# Patient Record
Sex: MALE | Employment: FULL TIME | URBAN - METROPOLITAN AREA
[De-identification: names, ages, dates, MRNs, and addresses within clinical notes are randomized per-mention and may not be internally consistent; named-entity substitution may affect disease eponyms.]

---

## 2019-01-15 ENCOUNTER — OFFICE VISIT (OUTPATIENT)
Dept: FAMILY MEDICINE | Facility: CLINIC | Age: 49
End: 2019-01-15
Payer: COMMERCIAL

## 2019-01-15 VITALS
OXYGEN SATURATION: 98 % | HEIGHT: 75 IN | HEART RATE: 90 BPM | BODY MASS INDEX: 34.69 KG/M2 | DIASTOLIC BLOOD PRESSURE: 100 MMHG | TEMPERATURE: 97.4 F | WEIGHT: 279 LBS | SYSTOLIC BLOOD PRESSURE: 160 MMHG

## 2019-01-15 DIAGNOSIS — Z13.1 SCREENING FOR DIABETES MELLITUS: ICD-10-CM

## 2019-01-15 DIAGNOSIS — Z13.220 LIPID SCREENING: ICD-10-CM

## 2019-01-15 DIAGNOSIS — I10 ESSENTIAL HYPERTENSION: Primary | ICD-10-CM

## 2019-01-15 PROCEDURE — 99204 OFFICE O/P NEW MOD 45 MIN: CPT | Performed by: FAMILY MEDICINE

## 2019-01-15 RX ORDER — HYDROCHLOROTHIAZIDE 25 MG/1
25 TABLET ORAL DAILY
Qty: 90 TABLET | Refills: 3 | Status: SHIPPED | OUTPATIENT
Start: 2019-01-15 | End: 2020-01-13

## 2019-01-15 SDOH — HEALTH STABILITY: MENTAL HEALTH: HOW OFTEN DO YOU HAVE A DRINK CONTAINING ALCOHOL?: 2-4 TIMES A MONTH

## 2019-01-15 ASSESSMENT — ENCOUNTER SYMPTOMS
COLOR CHANGE: 0
SINUS PAIN: 0
WHEEZING: 0
SLEEP DISTURBANCE: 0
SPEECH DIFFICULTY: 0
NUMBNESS: 0
NAUSEA: 0
ACTIVITY CHANGE: 0
DIFFICULTY URINATING: 0
BLOOD IN STOOL: 0
SEIZURES: 0
ABDOMINAL DISTENTION: 0
FATIGUE: 0
NERVOUS/ANXIOUS: 0
ABDOMINAL PAIN: 0
FEVER: 0
BACK PAIN: 0
HEMATURIA: 0
PALPITATIONS: 0
DYSURIA: 0
SHORTNESS OF BREATH: 0
FACIAL SWELLING: 0
WEAKNESS: 0
JOINT SWELLING: 0
DIZZINESS: 0
APPETITE CHANGE: 0
COUGH: 0
EYE DISCHARGE: 0
SORE THROAT: 0
AGITATION: 0
CHILLS: 0
EYE PAIN: 0
VOMITING: 0
WOUND: 0
EYE ITCHING: 0
HEADACHES: 0

## 2019-01-15 ASSESSMENT — MIFFLIN-ST. JEOR: SCORE: 2220.13

## 2019-01-15 NOTE — PROGRESS NOTES
SUBJECTIVE:   Alin Yang is a 48 year old male who presents to clinic today for the following health issues:      *Establish care  *Elevated blood pressure at recent clinic visit    Past Medical History:   Diagnosis Date     Alopecia        Past Surgical History:   Procedure Laterality Date     FINGER SURGERY Left     4th phalanx       Family History   Problem Relation Age of Onset     ALS Mother      Hypertension Father        Social History     Tobacco Use     Smoking status: Former Smoker     Smokeless tobacco: Former User   Substance Use Topics     Alcohol use: Yes     Frequency: 2-4 times a month     Comment: weekends (5-6 beers)      No Known Allergies    No current outpatient medications on file.     No current facility-administered medications for this visit.          Reviewed and updated as needed this visit by clinical staff  Tobacco  Allergies  Meds  Med Hx  Surg Hx  Fam Hx  Soc Hx      Reviewed and updated as needed this visit by Provider        1. Establish care    2. High blood pressure: Noticed elevated 3 years ago.  Less active.  Noticed weight gain.  Gained 30 lbs over the past 4 years.  Patient use to life weights.  Patient kayaks.  Family history.  Patient is not smoker.  Patient does not have a strict.  Patient thinks he can do better on portion size.  No headaches or vision issues.  No chest pain.     3. Health maintenance: Patient would like to be screened for diabetes and high cholesterol.       ROS:  Review of Systems   Constitutional: Negative for activity change, appetite change, chills, fatigue and fever.   HENT: Negative for congestion, ear pain, facial swelling, sinus pain and sore throat.    Eyes: Negative for pain, discharge, itching and visual disturbance.   Respiratory: Negative for cough, shortness of breath and wheezing.    Cardiovascular: Negative for chest pain, palpitations and leg swelling.   Gastrointestinal: Negative for abdominal distention, abdominal pain, blood  "in stool, nausea and vomiting.   Genitourinary: Negative for difficulty urinating, dysuria, hematuria and testicular pain.   Musculoskeletal: Negative for back pain, gait problem and joint swelling.   Skin: Negative for color change, rash and wound.   Neurological: Negative for dizziness, seizures, speech difficulty, weakness, numbness and headaches.   Psychiatric/Behavioral: Negative for agitation, behavioral problems, sleep disturbance and suicidal ideas. The patient is not nervous/anxious.        OBJECTIVE:     BP (!) 160/100 (BP Location: Left arm, Cuff Size: Adult Large)   Pulse 90   Temp 97.4  F (36.3  C) (Tympanic)   Ht 1.911 m (6' 3.25\")   Wt 126.6 kg (279 lb)   SpO2 98%   BMI 34.64 kg/m    Body mass index is 34.64 kg/m .  Physical Exam   Constitutional: He is oriented to person, place, and time. He appears well-developed and well-nourished. No distress.   HENT:   Head: Normocephalic and atraumatic.   Nose: Nose normal.   Mouth/Throat: No oropharyngeal exudate.   Eyes: Conjunctivae and EOM are normal. Pupils are equal, round, and reactive to light. Right eye exhibits no discharge. Left eye exhibits no discharge.   Neck: Normal range of motion. Neck supple. No tracheal deviation present. No thyromegaly present.   Cardiovascular: Normal rate, regular rhythm and normal heart sounds.   Pulmonary/Chest: Effort normal and breath sounds normal. No respiratory distress. He has no wheezes. He has no rales.   Abdominal: Soft. He exhibits no distension and no mass. There is no tenderness. There is no rebound and no guarding.   Overweight   Musculoskeletal: Normal range of motion. He exhibits no edema or deformity.   Lymphadenopathy:     He has no cervical adenopathy.   Neurological: He is alert and oriented to person, place, and time. Coordination normal.   Skin: Skin is warm and dry. No rash noted. No erythema.   Psychiatric: He has a normal mood and affect. His behavior is normal. Thought content normal. "     ASSESSMENT/PLAN:     1. Essential hypertension  Newly diagnosed.  Stressed the importance of diet and exercise.  DASH diet given to patient.   - hydrochlorothiazide (HYDRODIURIL) 25 MG tablet; Take 1 tablet (25 mg) by mouth daily  Dispense: 90 tablet; Refill: 3    2. Lipid screening  - Lipid Profile (Chol, Trig, HDL, LDL calc); Future    3. Screening for diabetes mellitus  - Glucose    See Patient Instructions    Wade Ely [facility]

## 2019-01-16 DIAGNOSIS — Z13.1 SCREENING FOR DIABETES MELLITUS: ICD-10-CM

## 2019-01-16 DIAGNOSIS — Z13.220 LIPID SCREENING: ICD-10-CM

## 2019-01-16 LAB
CHOLEST SERPL-MCNC: 214 MG/DL
GLUCOSE SERPL-MCNC: 92 MG/DL (ref 70–99)
HDLC SERPL-MCNC: 46 MG/DL
LDLC SERPL CALC-MCNC: 130 MG/DL
NONHDLC SERPL-MCNC: 168 MG/DL
TRIGL SERPL-MCNC: 192 MG/DL

## 2019-01-16 PROCEDURE — 36415 COLL VENOUS BLD VENIPUNCTURE: CPT | Performed by: FAMILY MEDICINE

## 2019-01-16 PROCEDURE — 80061 LIPID PANEL: CPT | Performed by: FAMILY MEDICINE

## 2019-01-16 PROCEDURE — 82947 ASSAY GLUCOSE BLOOD QUANT: CPT | Performed by: FAMILY MEDICINE

## 2019-01-17 PROBLEM — E78.5 BORDERLINE HYPERLIPIDEMIA: Status: ACTIVE | Noted: 2019-01-17

## 2019-02-11 NOTE — PROGRESS NOTES
SUBJECTIVE:   Alin Yang is a 48 year old male who presents to clinic today for the following health issues:      Hypertension Follow-up      Outpatient blood pressures are not being checked.    Low Salt Diet: not monitoring salt      Amount of exercise or physical activity: 4-5 days/week     Problems taking medications regularly: No    Medication side effects: none    Diet: regular (no restrictions)    Patient Active Problem List   Diagnosis     Alopecia     Borderline hyperlipidemia     Past Surgical History:   Procedure Laterality Date     FINGER SURGERY Left     4th phalanx       Social History     Tobacco Use     Smoking status: Former Smoker     Smokeless tobacco: Former User   Substance Use Topics     Alcohol use: Yes     Frequency: 2-4 times a month     Comment: weekends (5-6 beers)     Family History   Problem Relation Age of Onset     ALS Mother      Hypertension Father          Current Outpatient Medications   Medication Sig Dispense Refill     hydrochlorothiazide (HYDRODIURIL) 25 MG tablet Take 1 tablet (25 mg) by mouth daily 90 tablet 3     No Known Allergies    Reviewed and updated as needed this visit by clinical staff  Tobacco  Allergies  Meds  Med Hx  Surg Hx  Fam Hx  Soc Hx      Reviewed and updated as needed this visit by Provider        1. Hypertension f/u: Had blood pressure reading 140/80.  Lost 5 lbs since the last visit.  Tolerating hydrochlorothiazide.  Family history of HTN.  No chest pain or SOB.  He is overweight.     ROS:  Review of Systems   Constitutional: Negative for activity change, appetite change, fatigue and fever.   HENT: Negative.    Eyes: Negative for pain, discharge and visual disturbance.   Respiratory: Negative for cough, shortness of breath and wheezing.    Cardiovascular: Negative for chest pain, palpitations and leg swelling.   Gastrointestinal: Negative.    Skin: Negative for color change and rash.   Neurological: Negative for dizziness, seizures and  "headaches.   Psychiatric/Behavioral: Negative for agitation, confusion, decreased concentration and hallucinations. The patient is not nervous/anxious.        OBJECTIVE:     BP (!) 146/98 (BP Location: Left arm, Cuff Size: Adult Large)   Pulse 84   Temp 98.9  F (37.2  C) (Tympanic)   Ht 1.911 m (6' 3.25\")   Wt 124.3 kg (274 lb)   SpO2 97%   BMI 34.02 kg/m    Body mass index is 34.02 kg/m .  Physical Exam   Constitutional: He is oriented to person, place, and time. He appears well-developed and well-nourished. No distress.   HENT:   Head: Normocephalic and atraumatic.   Nose: Nose normal.   Eyes: Conjunctivae and EOM are normal.   Neck: Normal range of motion. No tracheal deviation present.   Cardiovascular: Normal rate, regular rhythm and normal heart sounds.   Pulmonary/Chest: Effort normal. He has no wheezes.   Musculoskeletal: Normal range of motion.   Neurological: He is alert and oriented to person, place, and time.   Skin: No rash noted. No erythema.   Psychiatric: He has a normal mood and affect. His behavior is normal.     ASSESSMENT/PLAN:     1. Benign essential hypertension  Not well controlled.  Continue with hydrochlorothiazide.  Stressed the importance of diet and exercise.  Decrease salt intake.   - lisinopril (PRINIVIL/ZESTRIL) 10 MG tablet; Take 1 tablet (10 mg) by mouth daily  Dispense: 90 tablet; Refill: 3    See Patient Instructions    Wade Ely,   [facility]    "

## 2019-02-12 ENCOUNTER — OFFICE VISIT (OUTPATIENT)
Dept: FAMILY MEDICINE | Facility: CLINIC | Age: 49
End: 2019-02-12
Payer: COMMERCIAL

## 2019-02-12 VITALS
BODY MASS INDEX: 34.07 KG/M2 | HEART RATE: 84 BPM | TEMPERATURE: 98.9 F | WEIGHT: 274 LBS | HEIGHT: 75 IN | DIASTOLIC BLOOD PRESSURE: 98 MMHG | OXYGEN SATURATION: 97 % | SYSTOLIC BLOOD PRESSURE: 146 MMHG

## 2019-02-12 DIAGNOSIS — I10 BENIGN ESSENTIAL HYPERTENSION: Primary | ICD-10-CM

## 2019-02-12 PROCEDURE — 99213 OFFICE O/P EST LOW 20 MIN: CPT | Performed by: FAMILY MEDICINE

## 2019-02-12 RX ORDER — LISINOPRIL 10 MG/1
10 TABLET ORAL DAILY
Qty: 90 TABLET | Refills: 3 | Status: SHIPPED | OUTPATIENT
Start: 2019-02-12 | End: 2020-02-07

## 2019-02-12 ASSESSMENT — ENCOUNTER SYMPTOMS
COLOR CHANGE: 0
HEADACHES: 0
APPETITE CHANGE: 0
GASTROINTESTINAL NEGATIVE: 1
WHEEZING: 0
HALLUCINATIONS: 0
AGITATION: 0
COUGH: 0
SEIZURES: 0
SHORTNESS OF BREATH: 0
NERVOUS/ANXIOUS: 0
DIZZINESS: 0
CONFUSION: 0
DECREASED CONCENTRATION: 0
EYE PAIN: 0
FATIGUE: 0
FEVER: 0
PALPITATIONS: 0
EYE DISCHARGE: 0
ACTIVITY CHANGE: 0

## 2019-02-12 ASSESSMENT — MIFFLIN-ST. JEOR: SCORE: 2197.45

## 2019-02-12 NOTE — PATIENT INSTRUCTIONS
Maureen Yang,    Thank you for allowing [facility] to manage your care.    I sent your prescriptions to your pharmacy.    For your high blood pressure, I am adding lisinopril 10 mg daily.     Encourage 2 hours per week of cardiovascular activities (cycling, swimming, jogging, elliptical, or treadmill)    Please decrease your salt intake.     If you have any questions or concerns, please feel free to call us at [phone].    Sincerely,    Dr. Ely      Patient Education     Discharge Instructions for High Blood Pressure (Hypertension)  You have been diagnosed with high blood pressure (also called hypertension). This means the force of blood against your artery walls is too strong. It also means your heart is working hard to move blood. High blood pressure usually has no symptoms, but over time, it can cause serious health problems. High blood pressure raises your risk for heart attack, stroke, heart failure, kidney disease, and blindness. With help from your doctor, you can manage your blood pressure and protect your health.  Blood pressure measurements are given as 2 numbers. Systolic blood pressure is the upper number. This is the pressure when the heart contracts. Diastolic blood pressure is the lower number. This is the pressure when the heart relaxes between beats.  Blood pressure is categorized as normal, elevated, or stage 1 or stage 2 high blood pressure:    Normal blood pressure is systolic of less than 120 and diastolic of less than 80 (120/80)    Elevated blood pressure is systolic of 120 to 129 and diastolic less than 80    Stage 1 high blood pressure is systolic is 130 to 139 or diastolic between 80 to 89    Stage 2 high blood pressure is when systolic is 140 or higher or the diastolic is 90 or higher  Taking medicine    Learn to take your own blood pressure. Keep a record of your results. Ask your doctor which readings mean that you need medical attention.    Take your blood pressure  "medicine exactly as directed. Don t skip doses. Missing doses can cause your blood pressure to get out of control.    If you do miss a dose (or doses) check with your healthcare provider about what to do.    Don't take medicines that contain heart stimulants, including over-the-counter medicines. Check for warnings about high blood pressure on the label. Ask the pharmacist before purchasing something you haven't used before    Check with your doctor or pharmacist before taking a decongestant. Some decongestants can worsen high blood pressure.  Lifestyle changes    Maintain a healthy weight. Get help to lose any extra pounds.    Cut back on salt.  ? Limit canned, dried, packaged, and fast foods.  ? Don t add salt to your food at the table.  ? Season foods with herbs instead of salt when you cook.  ? Request no added salt when you go to a restaurant.  ? The American Heart Association (AHA) says the \"ideal\" amount of sodium is no more than 1,500 mg a day.  But because Americans eat so much salt, you can make a positive change by cutting back to even 2,400 mg of sodium a day.     Follow the DASH (Dietary Approaches to Stop Hypertension) eating plan. This plan recommends vegetables, fruits, whole gains, and other heart healthy foods.    Begin an exercise program. Ask your healthcare provider how to get started. The AHA recommends aerobic exercise 3 to 4 times a week for an average of 40 minutes at a time, with your provider's approval. Simple activities like walking or gardening can help.    Break the smoking habit. Enroll in a stop-smoking program to improve your chances of success. Ask your healthcare provider about programs and medicines to help you stop smoking.    Limit drinks that contain caffeine such as coffee, black or green tea, and cola to 2 per day.    Never take stimulants such as amphetamines or cocaine. These drugs can be deadly for someone with high blood pressure.    Control your stress. Learn ways to " manage stress.    Limit alcohol to no more than 1 drink a day for women and 2 drinks a day for men.  Follow-up care  Make a follow-up appointment as directed.  When to call your healthcare provider  Call your healthcare provider right away if you have any of the following:    Chest pain or shortness of breath (call 911)    Moderate to severe headache    Weakness in the muscles of your face, arms, or legs    Trouble speaking    Extreme drowsiness    Confusion    Fainting or dizziness    Pulsating or rushing sound in your ears    Unexplained nosebleed    Weakness, tingling, or numbness of your face, arms, or legs    Change in vision    Blood pressure measured at home that is greater than 180/110   Date Last Reviewed: 4/27/2016 2000-2018 The "[employer]. [address] [place] [address], [place] [address]. All rights reserved. This information is not intended as a substitute for professional medical care. Always follow your healthcare professional's instructions.

## 2019-03-19 ENCOUNTER — OFFICE VISIT (OUTPATIENT)
Dept: FAMILY MEDICINE | Facility: CLINIC | Age: 49
End: 2019-03-19
Payer: COMMERCIAL

## 2019-03-19 VITALS
DIASTOLIC BLOOD PRESSURE: 86 MMHG | SYSTOLIC BLOOD PRESSURE: 132 MMHG | HEIGHT: 76 IN | OXYGEN SATURATION: 98 % | WEIGHT: 270.6 LBS | BODY MASS INDEX: 32.95 KG/M2 | TEMPERATURE: 97.8 F | HEART RATE: 80 BPM

## 2019-03-19 DIAGNOSIS — I10 ESSENTIAL HYPERTENSION: Primary | ICD-10-CM

## 2019-03-19 PROCEDURE — 99213 OFFICE O/P EST LOW 20 MIN: CPT | Performed by: FAMILY MEDICINE

## 2019-03-19 ASSESSMENT — ENCOUNTER SYMPTOMS
CONFUSION: 0
AGITATION: 0
SEIZURES: 0
EYE PAIN: 0
ENDOCRINE NEGATIVE: 1
HEADACHES: 0
NERVOUS/ANXIOUS: 0
FEVER: 0
GASTROINTESTINAL NEGATIVE: 1
APPETITE CHANGE: 0
COUGH: 0
DECREASED CONCENTRATION: 0
FATIGUE: 0
DIZZINESS: 0
ACTIVITY CHANGE: 0
HALLUCINATIONS: 0
PALPITATIONS: 0
COLOR CHANGE: 0
WHEEZING: 0
ALLERGIC/IMMUNOLOGIC NEGATIVE: 1
HEMATOLOGIC/LYMPHATIC NEGATIVE: 1
SHORTNESS OF BREATH: 0
EYE DISCHARGE: 0

## 2019-03-19 ASSESSMENT — MIFFLIN-ST. JEOR: SCORE: 2189.96

## 2019-03-19 NOTE — PATIENT INSTRUCTIONS
Maureen Yang,    Thank you for allowing [facility] to manage your care.    Continue with your current medications.     Encourage 2 hours per week of cardiovascular activities (cycling, swimming, jogging, elliptical, or treadmill)    Please decrease your salt intake.     If you have any questions or concerns, please feel free to call us at [phone].    Sincerely,    Dr. Ely

## 2019-03-19 NOTE — PROGRESS NOTES
SUBJECTIVE:   Alin Yang is a 48 year old male who presents to clinic today for the following health issues:      Hypertension Follow-up      Outpatient blood pressures are not being checked.    Low Salt Diet: not monitoring salt      Amount of exercise or physical activity: 2-3 days/week     Problems taking medications regularly: No    Medication side effects: none    Diet: regular (no restrictions)    Patient Active Problem List   Diagnosis     Alopecia     Borderline hyperlipidemia     Past Surgical History:   Procedure Laterality Date     FINGER SURGERY Left     4th phalanx       Social History     Tobacco Use     Smoking status: Former Smoker     Smokeless tobacco: Former User   Substance Use Topics     Alcohol use: Yes     Frequency: 2-4 times a month     Comment: weekends (5-6 beers)     Family History   Problem Relation Age of Onset     ALS Mother      Hypertension Father          Current Outpatient Medications   Medication Sig Dispense Refill     hydrochlorothiazide (HYDRODIURIL) 25 MG tablet Take 1 tablet (25 mg) by mouth daily 90 tablet 3     lisinopril (PRINIVIL/ZESTRIL) 10 MG tablet Take 1 tablet (10 mg) by mouth daily 90 tablet 3     No Known Allergies    Reviewed and updated as needed this visit by clinical staff  Tobacco  Allergies  Meds  Med Hx  Surg Hx  Fam Hx  Soc Hx      Reviewed and updated as needed this visit by Provider       1. Hypertension f/u: Patient is currently taking hydrochlorothiazide and lisinopril.  Lost 4 lbs since the last visit.  Lisinopril was added at last visit.  Patient is going through a lot of stressors in regards to mother is dealing with medical issues and work related stress.  Otherwise, he has not been exercising.  No chest pain or SOB.     ROS:  Review of Systems   Constitutional: Negative for activity change, appetite change, fatigue and fever.   HENT: Negative.    Eyes: Negative for pain, discharge and visual disturbance.   Respiratory: Negative for  "cough, shortness of breath and wheezing.    Cardiovascular: Negative for chest pain, palpitations and leg swelling.   Gastrointestinal: Negative.    Endocrine: Negative.    Genitourinary: Negative.    Skin: Negative for color change and rash.   Allergic/Immunologic: Negative.    Neurological: Negative for dizziness, seizures and headaches.   Hematological: Negative.    Psychiatric/Behavioral: Negative for agitation, confusion, decreased concentration and hallucinations. The patient is not nervous/anxious.        OBJECTIVE:     /90 (BP Location: Left arm, Cuff Size: Adult Large)   Pulse 80   Temp 97.8  F (36.6  C) (Tympanic)   Ht 1.924 m (6' 3.75\")   Wt 122.7 kg (270 lb 9.6 oz)   SpO2 98%   BMI 33.16 kg/m    Body mass index is 33.16 kg/m .  Physical Exam   Constitutional: He is oriented to person, place, and time. He appears well-developed and well-nourished. No distress.   HENT:   Head: Normocephalic and atraumatic.   Nose: Nose normal.   Eyes: Conjunctivae and EOM are normal.   Neck: Normal range of motion. No tracheal deviation present.   Cardiovascular: Normal rate, regular rhythm and normal heart sounds.   Pulmonary/Chest: Effort normal. He has no wheezes.   Musculoskeletal: Normal range of motion.   Neurological: He is alert and oriented to person, place, and time.   Skin: No rash noted. No erythema.   Psychiatric: He has a normal mood and affect. His behavior is normal.       ASSESSMENT/PLAN:     1. Essential hypertension  Improved.  Continue with current medications.  Stressed the importance of diet and exercise.     See Patient Instructions    Wade Ely DO  [facility]    "

## 2019-09-15 ASSESSMENT — ENCOUNTER SYMPTOMS
FREQUENCY: 0
DYSURIA: 0
HEADACHES: 0
NERVOUS/ANXIOUS: 0
CONSTIPATION: 0
COUGH: 0
PARESTHESIAS: 0
MYALGIAS: 0
JOINT SWELLING: 0
ABDOMINAL PAIN: 0
NAUSEA: 0
ARTHRALGIAS: 0
HEMATOCHEZIA: 0
HEARTBURN: 0
CHILLS: 0
SORE THROAT: 0
EYE PAIN: 0
DIZZINESS: 0
HEMATURIA: 0
DIARRHEA: 0
FEVER: 0
PALPITATIONS: 0
WEAKNESS: 0
SHORTNESS OF BREATH: 0

## 2019-09-17 ENCOUNTER — OFFICE VISIT (OUTPATIENT)
Dept: FAMILY MEDICINE | Facility: CLINIC | Age: 49
End: 2019-09-17
Payer: COMMERCIAL

## 2019-09-17 VITALS
BODY MASS INDEX: 32.85 KG/M2 | OXYGEN SATURATION: 97 % | HEART RATE: 77 BPM | SYSTOLIC BLOOD PRESSURE: 138 MMHG | DIASTOLIC BLOOD PRESSURE: 86 MMHG | HEIGHT: 76 IN | TEMPERATURE: 98.4 F | WEIGHT: 269.8 LBS

## 2019-09-17 DIAGNOSIS — Z00.00 ROUTINE PHYSICAL EXAMINATION: Primary | ICD-10-CM

## 2019-09-17 DIAGNOSIS — Z12.11 SCREEN FOR COLON CANCER: ICD-10-CM

## 2019-09-17 PROCEDURE — 99396 PREV VISIT EST AGE 40-64: CPT | Performed by: FAMILY MEDICINE

## 2019-09-17 ASSESSMENT — MIFFLIN-ST. JEOR: SCORE: 2177.36

## 2019-09-17 ASSESSMENT — ENCOUNTER SYMPTOMS
FREQUENCY: 0
WEAKNESS: 0
CHILLS: 0
HEMATOCHEZIA: 0
DIZZINESS: 0
PARESTHESIAS: 0
NAUSEA: 0
ABDOMINAL PAIN: 0
JOINT SWELLING: 0
FEVER: 0
PALPITATIONS: 0
CONSTIPATION: 0
DIARRHEA: 0
HEADACHES: 0
MYALGIAS: 0
DYSURIA: 0
NERVOUS/ANXIOUS: 0
EYE PAIN: 0
COUGH: 0
HEARTBURN: 0
SHORTNESS OF BREATH: 0
SORE THROAT: 0
ARTHRALGIAS: 0
HEMATURIA: 0

## 2019-09-17 NOTE — PROGRESS NOTES
SUBJECTIVE:   CC: Alin Yang is an 49 year old male who presents for preventative health visit.     Healthy Habits:     Getting at least 3 servings of Calcium per day:  Yes    Bi-annual eye exam:  Yes    Dental care twice a year:  Yes    Sleep apnea or symptoms of sleep apnea:  None    Diet:  Regular (no restrictions)    Frequency of exercise:  2-3 days/week    Duration of exercise:  Less than 15 minutes    Taking medications regularly:  Yes    Medication side effects:  None    PHQ-2 Total Score: 0    Additional concerns today:  No              Today's PHQ-2 Score:   PHQ-2 ( 1999 [employer]) 9/15/2019   Q1: Little interest or pleasure in doing things 0   Q2: Feeling down, depressed or hopeless 0   PHQ-2 Score 0   Q1: Little interest or pleasure in doing things Not at all   Q2: Feeling down, depressed or hopeless Not at all   PHQ-2 Score 0       Abuse: Current or Past(Physical, Sexual or Emotional)- No  Do you feel safe in your environment? Yes    Social History     Tobacco Use     Smoking status: Former Smoker     Smokeless tobacco: Former User   Substance Use Topics     Alcohol use: Yes     Frequency: 2-4 times a month     Comment: weekends (5-6 beers)     If you drink alcohol do you typically have >3 drinks per day or >7 drinks per week? No    Alcohol Use 9/17/2019   Prescreen: >3 drinks/day or >7 drinks/week? -   Prescreen: >3 drinks/day or >7 drinks/week? No       Last PSA: No results found for: PSA    Reviewed orders with patient. Reviewed health maintenance and updated orders accordingly - Yes      Reviewed and updated as needed this visit by clinical staff  Tobacco  Allergies  Meds  Problems  Med Hx  Surg Hx  Fam Hx  Soc Hx          Reviewed and updated as needed this visit by Provider  Tobacco  Allergies  Meds  Problems  Med Hx  Surg Hx  Fam Hx        Past Medical History:   Diagnosis Date     Alopecia       Past Surgical History:   Procedure Laterality Date     FINGER SURGERY Left     4th  "phalanx     1. Physical exam: Patient's mother passed away last July from ALS.  Patient is going through stressors in regards to his job.       Review of Systems   Constitutional: Negative for chills and fever.   HENT: Negative for congestion, ear pain, hearing loss and sore throat.    Eyes: Negative for pain and visual disturbance.   Respiratory: Negative for cough and shortness of breath.    Cardiovascular: Negative for chest pain, palpitations and peripheral edema.   Gastrointestinal: Negative for abdominal pain, constipation, diarrhea, heartburn, hematochezia and nausea.   Genitourinary: Negative for discharge, dysuria, frequency, genital sores, hematuria, impotence and urgency.   Musculoskeletal: Negative for arthralgias, joint swelling and myalgias.   Skin: Negative for rash.   Neurological: Negative for dizziness, weakness, headaches and paresthesias.   Psychiatric/Behavioral: Negative for mood changes. The patient is not nervous/anxious.      OBJECTIVE:   /86 (BP Location: Left arm, Cuff Size: Adult Large)   Pulse 77   Temp 98.4  F (36.9  C) (Tympanic)   Ht 1.918 m (6' 3.5\")   Wt 122.4 kg (269 lb 12.8 oz)   SpO2 97%   BMI 33.28 kg/m      Physical Exam   Constitutional: He is oriented to person, place, and time. He appears well-developed and well-nourished. No distress.   HENT:   Head: Normocephalic and atraumatic.   Right Ear: External ear normal.   Left Ear: External ear normal.   Nose: Nose normal.   Mouth/Throat: Oropharynx is clear and moist. No oropharyngeal exudate.   Eyes: Pupils are equal, round, and reactive to light. Conjunctivae and EOM are normal. Right eye exhibits no discharge. Left eye exhibits no discharge.   Neck: Normal range of motion. Neck supple. No tracheal deviation present. No thyromegaly present.   Cardiovascular: Normal rate, regular rhythm and normal heart sounds.   Pulmonary/Chest: Effort normal and breath sounds normal. No respiratory distress. He has no wheezes. He " "has no rales.   Abdominal: Soft. He exhibits no distension and no mass. There is no tenderness. There is no rebound and no guarding.   Musculoskeletal: Normal range of motion. He exhibits no edema or deformity.   Lymphadenopathy:     He has no cervical adenopathy.   Neurological: He is alert and oriented to person, place, and time. Coordination normal.   Skin: Skin is warm and dry. No rash noted. No erythema.   Psychiatric: He has a normal mood and affect. His behavior is normal. Thought content normal.     ASSESSMENT/PLAN:     1. Routine physical examination      2. Screen for colon cancer  - GASTROENTEROLOGY ADULT REF PROCEDURE ONLY    COUNSELING:   Reviewed preventive health counseling, as reflected in patient instructions       Regular exercise       Healthy diet/nutrition    Estimated body mass index is 33.28 kg/m  as calculated from the following:    Height as of this encounter: 1.918 m (6' 3.5\").    Weight as of this encounter: 122.4 kg (269 lb 12.8 oz).     Weight management plan: Discussed healthy diet and exercise guidelines     reports that he has quit smoking. He has quit using smokeless tobacco.      Counseling Resources:  ATP IV Guidelines  Pooled Cohorts Equation Calculator  FRAX Risk Assessment  ICSI Preventive Guidelines  Dietary Guidelines for Americans, 2010  USDA's MyPlate  ASA Prophylaxis  Lung CA Screening    Wade Ely DO  [facility]  "

## 2019-09-17 NOTE — PATIENT INSTRUCTIONS
Maureen Yang,    Thank you for allowing [facility] to manage your care.    I made a colonoscopy referral, they will be in 1-2 weeks to set up your appointment.  If you do not hear from them, please call the specialty number on your after visit.     Encourage 2.5 hours per week of cardiovascular activities (cycling, swimming, jogging, elliptical, or treadmill)    If you have any questions or concerns, please feel free to call us at [phone].    Sincerely,    Dr. Ely

## 2020-01-13 ENCOUNTER — TELEPHONE (OUTPATIENT)
Dept: FAMILY MEDICINE | Facility: CLINIC | Age: 50
End: 2020-01-13

## 2020-01-13 DIAGNOSIS — I10 ESSENTIAL HYPERTENSION: ICD-10-CM

## 2020-01-13 RX ORDER — HYDROCHLOROTHIAZIDE 25 MG/1
25 TABLET ORAL DAILY
Qty: 90 TABLET | Refills: 3 | Status: SHIPPED | OUTPATIENT
Start: 2020-01-13 | End: 2020-11-23

## 2020-01-13 NOTE — TELEPHONE ENCOUNTER
Reason for Call:  Medication or medication refill:    Do you use a [facility]?  Name of the pharmacy and phone number for the current request:  See encounter     Name of the medication requested: see encounter     Other request:  Pt is out of his med's. Advised three day turn around policy.     Can we leave a detailed message on this number? YES    Phone number patient can be reached at: Home number on file [phone] (home)    Best Time: any    Call taken on 1/13/2020 at 1:36 PM by Rona Lopez

## 2020-01-13 NOTE — TELEPHONE ENCOUNTER
Routing refill request to provider for review/approval because:  Need BMP to refill per RN protocol, no record of BMP.  Hilaria Carmona RN

## 2020-01-29 ENCOUNTER — OFFICE VISIT (OUTPATIENT)
Dept: FAMILY MEDICINE | Facility: CLINIC | Age: 50
End: 2020-01-29
Payer: COMMERCIAL

## 2020-01-29 VITALS
HEART RATE: 79 BPM | RESPIRATION RATE: 18 BRPM | HEIGHT: 75 IN | SYSTOLIC BLOOD PRESSURE: 130 MMHG | BODY MASS INDEX: 34.57 KG/M2 | OXYGEN SATURATION: 97 % | TEMPERATURE: 97.1 F | WEIGHT: 278 LBS | DIASTOLIC BLOOD PRESSURE: 80 MMHG

## 2020-01-29 DIAGNOSIS — R20.2 TINGLING: Primary | ICD-10-CM

## 2020-01-29 LAB
ALBUMIN SERPL-MCNC: 4.5 G/DL (ref 3.4–5)
ALP SERPL-CCNC: 66 U/L (ref 40–150)
ALT SERPL W P-5'-P-CCNC: 42 U/L (ref 0–70)
ANION GAP SERPL CALCULATED.3IONS-SCNC: 5 MMOL/L (ref 3–14)
AST SERPL W P-5'-P-CCNC: 31 U/L (ref 0–45)
BASOPHILS # BLD AUTO: 0 10E9/L (ref 0–0.2)
BASOPHILS NFR BLD AUTO: 0.3 %
BILIRUB SERPL-MCNC: 0.5 MG/DL (ref 0.2–1.3)
BUN SERPL-MCNC: 18 MG/DL (ref 7–30)
CALCIUM SERPL-MCNC: 9.2 MG/DL (ref 8.5–10.1)
CHLORIDE SERPL-SCNC: 101 MMOL/L (ref 94–109)
CO2 SERPL-SCNC: 28 MMOL/L (ref 20–32)
CREAT SERPL-MCNC: 1.15 MG/DL (ref 0.66–1.25)
DEPRECATED CALCIDIOL+CALCIFEROL SERPL-MC: 10 UG/L (ref 20–75)
DIFFERENTIAL METHOD BLD: NORMAL
EOSINOPHIL # BLD AUTO: 0.1 10E9/L (ref 0–0.7)
EOSINOPHIL NFR BLD AUTO: 1.7 %
ERYTHROCYTE [DISTWIDTH] IN BLOOD BY AUTOMATED COUNT: 13.1 % (ref 10–15)
GFR SERPL CREATININE-BSD FRML MDRD: 73 ML/MIN/{1.73_M2}
GLUCOSE SERPL-MCNC: 97 MG/DL (ref 70–99)
HCT VFR BLD AUTO: 46.2 % (ref 40–53)
HGB BLD-MCNC: 16.1 G/DL (ref 13.3–17.7)
LYMPHOCYTES # BLD AUTO: 1.6 10E9/L (ref 0.8–5.3)
LYMPHOCYTES NFR BLD AUTO: 25.7 %
MCH RBC QN AUTO: 30.8 PG (ref 26.5–33)
MCHC RBC AUTO-ENTMCNC: 34.8 G/DL (ref 31.5–36.5)
MCV RBC AUTO: 89 FL (ref 78–100)
MONOCYTES # BLD AUTO: 0.5 10E9/L (ref 0–1.3)
MONOCYTES NFR BLD AUTO: 8.1 %
NEUTROPHILS # BLD AUTO: 3.9 10E9/L (ref 1.6–8.3)
NEUTROPHILS NFR BLD AUTO: 64.2 %
PLATELET # BLD AUTO: 164 10E9/L (ref 150–450)
POTASSIUM SERPL-SCNC: 3.7 MMOL/L (ref 3.4–5.3)
PROT SERPL-MCNC: 8 G/DL (ref 6.8–8.8)
RBC # BLD AUTO: 5.22 10E12/L (ref 4.4–5.9)
SODIUM SERPL-SCNC: 134 MMOL/L (ref 133–144)
TSH SERPL DL<=0.005 MIU/L-ACNC: 3.4 MU/L (ref 0.4–4)
VIT B12 SERPL-MCNC: 711 PG/ML (ref 193–986)
WBC # BLD AUTO: 6 10E9/L (ref 4–11)

## 2020-01-29 PROCEDURE — 80050 GENERAL HEALTH PANEL: CPT | Performed by: NURSE PRACTITIONER

## 2020-01-29 PROCEDURE — 82607 VITAMIN B-12: CPT | Performed by: NURSE PRACTITIONER

## 2020-01-29 PROCEDURE — 82306 VITAMIN D 25 HYDROXY: CPT | Performed by: NURSE PRACTITIONER

## 2020-01-29 PROCEDURE — 99214 OFFICE O/P EST MOD 30 MIN: CPT | Performed by: NURSE PRACTITIONER

## 2020-01-29 PROCEDURE — 36415 COLL VENOUS BLD VENIPUNCTURE: CPT | Performed by: NURSE PRACTITIONER

## 2020-01-29 ASSESSMENT — ENCOUNTER SYMPTOMS
WHEEZING: 0
DIARRHEA: 0
FATIGUE: 0
RHINORRHEA: 0
VOMITING: 0
NUMBNESS: 1
FEVER: 0
SHORTNESS OF BREATH: 0
COUGH: 0
DIAPHORESIS: 0
HEADACHES: 0
SORE THROAT: 0
EYE DISCHARGE: 0
SINUS PRESSURE: 0
NAUSEA: 0

## 2020-01-29 ASSESSMENT — MIFFLIN-ST. JEOR: SCORE: 2210.59

## 2020-01-29 ASSESSMENT — PAIN SCALES - GENERAL: PAINLEVEL: NO PAIN (0)

## 2020-01-30 DIAGNOSIS — E55.9 VITAMIN D DEFICIENCY: Primary | ICD-10-CM

## 2020-01-30 RX ORDER — ERGOCALCIFEROL 1.25 MG/1
50000 CAPSULE, LIQUID FILLED ORAL WEEKLY
Qty: 8 CAPSULE | Refills: 0 | Status: SHIPPED | OUTPATIENT
Start: 2020-01-30 | End: 2020-12-01

## 2020-02-07 DIAGNOSIS — I10 BENIGN ESSENTIAL HYPERTENSION: ICD-10-CM

## 2020-02-07 RX ORDER — LISINOPRIL 10 MG/1
10 TABLET ORAL DAILY
Qty: 90 TABLET | Refills: 3 | Status: SHIPPED | OUTPATIENT
Start: 2020-02-07 | End: 2020-11-23

## 2020-02-07 NOTE — TELEPHONE ENCOUNTER
"Requested Prescriptions   Pending Prescriptions Disp Refills     lisinopril (PRINIVIL/ZESTRIL) 10 MG tablet 90 tablet 3     Sig: Take 1 tablet (10 mg) by mouth daily  Last Written Prescription Date:  02/12/2019 #90 x 3  Last filled 11/06/2019  Last office visit: 1/29/2020 AUSTYN Delong   Future Office Visit:  None         ACE Inhibitors (Including Combos) Protocol Passed - 2/7/2020  7:32 AM        Passed - Blood pressure under 140/90 in past 12 months     BP Readings from Last 3 Encounters:   01/29/20 130/80   09/17/19 138/86   03/19/19 132/86                 Passed - Recent (12 mo) or future (30 days) visit within the authorizing provider's specialty     Patient has had an office visit with the authorizing provider or a provider within the authorizing providers department within the previous 12 mos or has a future within next 30 days. See \"Patient Info\" tab in inbasket, or \"Choose Columns\" in Meds & Orders section of the refill encounter.              Passed - Medication is active on med list        Passed - Patient is age 18 or older        Passed - Normal serum creatinine on file in past 12 months     Recent Labs   Lab Test 01/29/20  1156   CR 1.15             Passed - Normal serum potassium on file in past 12 months     Recent Labs   Lab Test 01/29/20  1156   POTASSIUM 3.7               "

## 2020-02-24 ENCOUNTER — HEALTH MAINTENANCE LETTER (OUTPATIENT)
Age: 50
End: 2020-02-24

## 2020-03-22 DIAGNOSIS — E55.9 VITAMIN D DEFICIENCY: ICD-10-CM

## 2020-03-23 NOTE — TELEPHONE ENCOUNTER
Requested Prescriptions   Pending Prescriptions Disp Refills     vitamin D2 (ERGOCALCIFEROL) 85398 units (1250 mcg) capsule [Pharmacy Med Name: VITAMIN D2 1.25MG(50,000 UNIT)] 4 capsule 1     Sig: TAKE 1 CAPSULE BY MOUTH ONCE A WEEK  Last Written Prescription Date:  01/30/2020 #8 x 0  Last filled 02/23/2020 #4 x 1  Last office visit: 1/29/2020 AUSTYN Delong   Future Office Visit:  None         There is no refill protocol information for this order

## 2020-03-24 RX ORDER — ERGOCALCIFEROL 1.25 MG/1
CAPSULE, LIQUID FILLED ORAL
Qty: 4 CAPSULE | Refills: 1 | OUTPATIENT
Start: 2020-03-24

## 2020-03-24 NOTE — TELEPHONE ENCOUNTER
Called and informed pt to transition to vitamin D 1000 units once daily (OTC) b/c he is complete with vitamin D 50,000 unit weekly treatment for 8 weeks. Will recheck vitamin D labs next when he's able to come back to clinic.     Allison Lyons, PharmD  Medication Therapy Management Provider, [facility] and [facility]  Pager: [phone]

## 2020-11-23 ENCOUNTER — TELEPHONE (OUTPATIENT)
Dept: FAMILY MEDICINE | Facility: CLINIC | Age: 50
End: 2020-11-23

## 2020-11-23 DIAGNOSIS — I10 ESSENTIAL HYPERTENSION: ICD-10-CM

## 2020-11-23 DIAGNOSIS — I10 BENIGN ESSENTIAL HYPERTENSION: ICD-10-CM

## 2020-11-23 RX ORDER — HYDROCHLOROTHIAZIDE 25 MG/1
25 TABLET ORAL DAILY
Qty: 30 TABLET | Refills: 0 | Status: SHIPPED | OUTPATIENT
Start: 2020-11-23 | End: 2020-12-01

## 2020-11-23 RX ORDER — LISINOPRIL 10 MG/1
10 TABLET ORAL DAILY
Qty: 30 TABLET | Refills: 0 | Status: SHIPPED | OUTPATIENT
Start: 2020-11-23 | End: 2020-12-01

## 2020-11-23 NOTE — TELEPHONE ENCOUNTER
Medication is being filled for 1 time refill only due to:  Patient needs to be seen because it has been more than one year since last visit.  Safia Magana RN    Last office visit: 1/29/2020 with prescribing provider:  Baljeet Blanco Office Visit:        Requested Prescriptions   Pending Prescriptions Disp Refills     hydrochlorothiazide (HYDRODIURIL) 25 MG tablet 90 tablet 3     Sig: Take 1 tablet (25 mg) by mouth daily       There is no refill protocol information for this order        lisinopril (ZESTRIL) 10 MG tablet 90 tablet 3     Sig: Take 1 tablet (10 mg) by mouth daily       There is no refill protocol information for this order

## 2020-12-01 ENCOUNTER — VIRTUAL VISIT (OUTPATIENT)
Dept: FAMILY MEDICINE | Facility: CLINIC | Age: 50
End: 2020-12-01
Payer: COMMERCIAL

## 2020-12-01 DIAGNOSIS — I10 ESSENTIAL HYPERTENSION: ICD-10-CM

## 2020-12-01 PROCEDURE — 99213 OFFICE O/P EST LOW 20 MIN: CPT | Mod: 95 | Performed by: FAMILY MEDICINE

## 2020-12-01 RX ORDER — LISINOPRIL 10 MG/1
10 TABLET ORAL DAILY
Qty: 90 TABLET | Refills: 3 | Status: SHIPPED | OUTPATIENT
Start: 2020-12-01 | End: 2021-05-06

## 2020-12-01 RX ORDER — HYDROCHLOROTHIAZIDE 25 MG/1
25 TABLET ORAL DAILY
Qty: 90 TABLET | Refills: 3 | Status: SHIPPED | OUTPATIENT
Start: 2020-12-01 | End: 2021-12-24

## 2020-12-01 NOTE — PATIENT INSTRUCTIONS
Maureen Ogden,    Thank you for allowing [facility] to manage your care.    I sent your prescriptions to your pharmacy.    If you have any questions or concerns, please feel free to call us at [phone].    Sincerely,    Dr. Ely    Did you know?      You can schedule a video visit for follow-up appointments as well as future appointments for certain conditions.  Please see the below link.     [email or web address]    If you have not already done so,  I encourage you to sign up for [facility] ([email or web address]).  This will allow you to review your results, securely communicate with a provider, and schedule virtual visits as well.

## 2020-12-01 NOTE — PROGRESS NOTES
"Alin Yang is a 50 year old male who is being evaluated via a billable telephone visit.      The patient has been notified of following:     \"This telephone visit will be conducted via a call between you and your physician/provider. We have found that certain health care needs can be provided without the need for a physical exam.  This service lets us provide the care you need with a short phone conversation.  If a prescription is necessary we can send it directly to your pharmacy.  If lab work is needed we can place an order for that and you can then stop by our lab to have the test done at a later time.    Telephone visits are billed at different rates depending on your insurance coverage. During this emergency period, for some insurers they may be billed the same as an in-person visit.  Please reach out to your insurance provider with any questions.    If during the course of the call the physician/provider feels a telephone visit is not appropriate, you will not be charged for this service.\"    Patient has given verbal consent for Telephone visit?  Yes    What phone number would you like to be contacted at? [phone]    How would you like to obtain your AVS? Zac Kapoor     Alin Yang is a 50 year old male who presents via phone visit today for the following health issues:    HPI     Hypertension Follow-up      Do you check your blood pressure regularly outside of the clinic? No     Are you following a low salt diet? Yes    Are your blood pressures ever more than 140 on the top number (systolic) OR more   than 90 on the bottom number (diastolic), for example 140/90? N/A      How many servings of fruits and vegetables do you eat daily?  0-1    On average, how many sweetened beverages do you drink each day (Examples: soda, juice, sweet tea, etc.  Do NOT count diet or artificially sweetened beverages)?   0    How many days per week do you exercise enough to make your heart beat faster? " None    How many minutes a day do you exercise enough to make your heart beat faster? 9 or less    How many days per week do you miss taking your medication? 0       1. Hypertension f/u: Patient's weight is unchanged.  States that blood pressure  130/80s.  Patient is active at work.  This has been chronic condition.    Review of Systems      Objective        Vitals:  No vitals were obtained today due to virtual visit.    healthy, alert and no distress  PSYCH: Alert and oriented times 3; coherent speech, normal   rate and volume, able to articulate logical thoughts, able   to abstract reason, no tangential thoughts, no hallucinations   or delusions  His affect is normal  RESP: No cough, no audible wheezing, able to talk in full sentences  Remainder of exam unable to be completed due to telephone visits    Assessment/Plan:    Assessment & Plan     1. Essential hypertension  Stressed the importance diet and exercise.  Encourage weight loss.   - hydrochlorothiazide (HYDRODIURIL) 25 MG tablet; Take 1 tablet (25 mg) by mouth daily  Dispense: 90 tablet; Refill: 3  - lisinopril (ZESTRIL) 10 MG tablet; Take 1 tablet (10 mg) by mouth daily  Dispense: 90 tablet; Refill: 3    See Patient Instructions    No follow-ups on file.    Wade Ely DO  [facility]    Phone call duration:  15 minutes

## 2020-12-13 ENCOUNTER — HEALTH MAINTENANCE LETTER (OUTPATIENT)
Age: 50
End: 2020-12-13
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
## 2021-05-04 DIAGNOSIS — I10 ESSENTIAL HYPERTENSION: ICD-10-CM

## 2021-05-04 RX ORDER — LISINOPRIL 10 MG/1
10 TABLET ORAL DAILY
Qty: 90 TABLET | Refills: 3 | Status: CANCELLED | OUTPATIENT
Start: 2021-05-04

## 2021-05-04 NOTE — TELEPHONE ENCOUNTER
"Requested Prescriptions   Pending Prescriptions Disp Refills    lisinopril (ZESTRIL) 10 MG tablet 90 tablet 3     Sig: Take 1 tablet (10 mg) by mouth daily       ACE Inhibitors (Including Combos) Protocol Failed - 5/4/2021  9:57 AM        Failed - Blood pressure under 140/90 in past 12 months     BP Readings from Last 3 Encounters:   01/29/20 130/80   09/17/19 138/86   03/19/19 132/86                 Failed - Normal serum creatinine on file in past 12 months     Recent Labs   Lab Test 01/29/20  1156   CR 1.15       Ok to refill medication if creatinine is low          Failed - Normal serum potassium on file in past 12 months     Recent Labs   Lab Test 01/29/20  1156   POTASSIUM 3.7             Passed - Recent (12 mo) or future (30 days) visit within the authorizing provider's specialty     Patient has had an office visit with the authorizing provider or a provider within the authorizing providers department within the previous 12 mos or has a future within next 30 days. See \"Patient Info\" tab in inbasket, or \"Choose Columns\" in Meds & Orders section of the refill encounter.              Passed - Medication is active on med list        Passed - Patient is age 18 or older           Routing refill request to provider for review/approval because:  Failed protocol.  Libby SERRATON-RN  [facility]    "

## 2021-05-06 DIAGNOSIS — I10 ESSENTIAL HYPERTENSION: ICD-10-CM

## 2021-05-06 RX ORDER — LISINOPRIL 10 MG/1
10 TABLET ORAL DAILY
Qty: 90 TABLET | Refills: 3 | Status: SHIPPED | OUTPATIENT
Start: 2021-05-06 | End: 2022-04-29

## 2021-09-26 ENCOUNTER — HEALTH MAINTENANCE LETTER (OUTPATIENT)
Age: 51
End: 2021-09-26

## 2021-12-23 DIAGNOSIS — I10 ESSENTIAL HYPERTENSION: ICD-10-CM

## 2021-12-24 RX ORDER — HYDROCHLOROTHIAZIDE 25 MG/1
25 TABLET ORAL DAILY
Qty: 90 TABLET | Refills: 0 | Status: SHIPPED | OUTPATIENT
Start: 2021-12-24 | End: 2022-04-05

## 2022-01-16 ENCOUNTER — HEALTH MAINTENANCE LETTER (OUTPATIENT)
Age: 52
End: 2022-01-16

## 2022-04-11 DIAGNOSIS — I10 ESSENTIAL HYPERTENSION: ICD-10-CM

## 2022-04-11 RX ORDER — HYDROCHLOROTHIAZIDE 25 MG/1
25 TABLET ORAL DAILY
Qty: 60 TABLET | Refills: 0 | Status: CANCELLED | OUTPATIENT
Start: 2022-04-11

## 2022-04-27 DIAGNOSIS — I10 ESSENTIAL HYPERTENSION: ICD-10-CM

## 2022-04-29 RX ORDER — LISINOPRIL 10 MG/1
10 TABLET ORAL DAILY
Qty: 90 TABLET | Refills: 3 | Status: SHIPPED | OUTPATIENT
Start: 2022-04-29 | End: 2023-04-03

## 2022-04-29 NOTE — TELEPHONE ENCOUNTER
Pt has upcoming appt scheduled for 05/17.    Routing refill request to provider for review/approval because:  Labs not current:  Cr, K+  It has been more than 1 year since last office visit.  BP not current.    BP Readings from Last 3 Encounters:   01/29/20 130/80   09/17/19 138/86   03/19/19 132/86     Creatinine   Date Value Ref Range Status   01/29/2020 1.15 0.66 - 1.25 mg/dL Final     Potassium   Date Value Ref Range Status   01/29/2020 3.7 3.4 - 5.3 mmol/L Final

## 2022-05-17 ENCOUNTER — OFFICE VISIT (OUTPATIENT)
Dept: FAMILY MEDICINE | Facility: CLINIC | Age: 52
End: 2022-05-17
Payer: COMMERCIAL

## 2022-05-17 VITALS
SYSTOLIC BLOOD PRESSURE: 150 MMHG | BODY MASS INDEX: 34.29 KG/M2 | HEIGHT: 76 IN | DIASTOLIC BLOOD PRESSURE: 80 MMHG | HEART RATE: 79 BPM | OXYGEN SATURATION: 98 % | TEMPERATURE: 97.9 F | WEIGHT: 281.6 LBS

## 2022-05-17 DIAGNOSIS — Z71.89 ADVANCED DIRECTIVES, COUNSELING/DISCUSSION: ICD-10-CM

## 2022-05-17 DIAGNOSIS — Z00.00 ROUTINE GENERAL MEDICAL EXAMINATION AT A HEALTH CARE FACILITY: Primary | ICD-10-CM

## 2022-05-17 DIAGNOSIS — Z13.220 LIPID SCREENING: ICD-10-CM

## 2022-05-17 DIAGNOSIS — Z12.11 SCREEN FOR COLON CANCER: ICD-10-CM

## 2022-05-17 DIAGNOSIS — I10 ESSENTIAL HYPERTENSION: ICD-10-CM

## 2022-05-17 LAB
ANION GAP SERPL CALCULATED.3IONS-SCNC: 7 MMOL/L (ref 3–14)
BUN SERPL-MCNC: 15 MG/DL (ref 7–30)
CALCIUM SERPL-MCNC: 9.4 MG/DL (ref 8.5–10.1)
CHLORIDE BLD-SCNC: 101 MMOL/L (ref 94–109)
CHOLEST SERPL-MCNC: 184 MG/DL
CO2 SERPL-SCNC: 28 MMOL/L (ref 20–32)
CREAT SERPL-MCNC: 1.2 MG/DL (ref 0.66–1.25)
FASTING STATUS PATIENT QL REPORTED: NO
GFR SERPL CREATININE-BSD FRML MDRD: 73 ML/MIN/1.73M2
GLUCOSE BLD-MCNC: 97 MG/DL (ref 70–99)
HDLC SERPL-MCNC: 50 MG/DL
LDLC SERPL CALC-MCNC: 80 MG/DL
NONHDLC SERPL-MCNC: 134 MG/DL
POTASSIUM BLD-SCNC: 4.5 MMOL/L (ref 3.4–5.3)
SODIUM SERPL-SCNC: 136 MMOL/L (ref 133–144)
TRIGL SERPL-MCNC: 270 MG/DL

## 2022-05-17 PROCEDURE — 99396 PREV VISIT EST AGE 40-64: CPT | Mod: 25 | Performed by: FAMILY MEDICINE

## 2022-05-17 PROCEDURE — 90471 IMMUNIZATION ADMIN: CPT | Performed by: FAMILY MEDICINE

## 2022-05-17 PROCEDURE — 90715 TDAP VACCINE 7 YRS/> IM: CPT | Performed by: FAMILY MEDICINE

## 2022-05-17 PROCEDURE — 80048 BASIC METABOLIC PNL TOTAL CA: CPT | Performed by: FAMILY MEDICINE

## 2022-05-17 PROCEDURE — 80061 LIPID PANEL: CPT | Performed by: FAMILY MEDICINE

## 2022-05-17 PROCEDURE — 36415 COLL VENOUS BLD VENIPUNCTURE: CPT | Performed by: FAMILY MEDICINE

## 2022-05-17 RX ORDER — HYDROCHLOROTHIAZIDE 25 MG/1
25 TABLET ORAL DAILY
Qty: 90 TABLET | Refills: 3 | Status: SHIPPED | OUTPATIENT
Start: 2022-05-17 | End: 2023-04-03

## 2022-05-17 ASSESSMENT — ENCOUNTER SYMPTOMS
PALPITATIONS: 0
CHILLS: 0
DIZZINESS: 0
MYALGIAS: 0
ARTHRALGIAS: 0
WEAKNESS: 0
DYSURIA: 0
SORE THROAT: 0
HEADACHES: 0
NERVOUS/ANXIOUS: 0
HEMATURIA: 0
SHORTNESS OF BREATH: 0
PARESTHESIAS: 0
DIARRHEA: 0
EYE PAIN: 0
JOINT SWELLING: 0
NAUSEA: 0
FREQUENCY: 0
HEARTBURN: 0
FEVER: 0
COUGH: 0
CONSTIPATION: 0
HEMATOCHEZIA: 0
ABDOMINAL PAIN: 0

## 2022-05-17 ASSESSMENT — PAIN SCALES - GENERAL: PAINLEVEL: NO PAIN (0)

## 2022-05-17 NOTE — PATIENT INSTRUCTIONS
Harish Ogden,    Thank you for allowing [facility] to manage your care.    I ordered some blood work, please go to the laboratory to get your laboratory studies.    I sent your prescriptions to your pharmacy.    Please check your blood pressure 2 months.  Encourage 2.5 hours (150 minutes) per week of cardiovascular activities (cycling, swimming, jogging, elliptical, or treadmill).  Send me a IntoOutdoors message with your blood pressure reading.  Goal of <140 top number and <90 bottom number.     I send a Cologuard kit to your house.     Continue with exercises for plantar fasciitis.     For your convenience, test results are released as soon as they are available  Please allow 1-2 business days for me to send you a comment about your results.  If not done so, I encourage you to login into Into[employer] ([email or web address] to review your results in real time.     If you have any questions or concerns, please feel free to call us at [phone].    Sincerely,    Dr. Ely    Did you know?      You can schedule a video visit for follow-up appointments as well as future appointments for certain conditions.  Please see the below link.     [email or web address]    If you have not already done so,  I encourage you to sign up for IntoOut[employer] ([email or web address]).  This will allow you to review your results, securely communicate with a provider, and schedule virtual visits as well.        Preventive Health Recommendations  Male Ages 50 - 64    Yearly exam:             See your health care provider every year in order to  o   Review health changes.   o   Discuss preventive care.    o   Review your medicines if your doctor has prescribed any.   Have a cholesterol test every 5 years, or more frequently if you are at risk for high cholesterol/heart disease.   Have a diabetes test (fasting glucose) every three years. If you are at risk for diabetes, you should have this  test more often.   Have a colonoscopy at age 50, or have a yearly FIT test (stool test). These exams will check for colon cancer.    Talk with your health care provider about whether or not a prostate cancer screening test (PSA) is right for you.  You should be tested each year for STDs (sexually transmitted diseases), if you re at risk.     Shots: Get a flu shot each year. Get a tetanus shot every 10 years.     Nutrition:  Eat at least 5 servings of fruits and vegetables daily.   Eat whole-grain bread, whole-wheat pasta and brown rice instead of white grains and rice.   Get adequate Calcium and Vitamin D.     Lifestyle  Exercise for at least 150 minutes a week (30 minutes a day, 5 days a week). This will help you control your weight and prevent disease.   Limit alcohol to one drink per day.   No smoking.   Wear sunscreen to prevent skin cancer.   See your dentist every six months for an exam and cleaning.   See your eye doctor every 1 to 2 years.

## 2022-05-17 NOTE — LETTER
May 23, 2022      Alin Yang  [address]  [place] [address]              Hi Alin,     Your recent results show the following:  -Cholesterol levels (LDL,HDL, Triglycerides) are normal.  Encourage 2.5 hours (150 minutes) per week of cardiovascular activities (cycling, swimming, jogging, elliptical, or treadmill) ADVISE: rechecking in 1 year.   -Kidney function is normal (Cr, GFR), Sodium is normal, Potassium is normal, Calcium is normal, Glucose is normal (you do not have diabetes).        Resulted Orders   Lipid panel reflex to direct LDL Non-fasting   Result Value Ref Range    Cholesterol 184 <200 mg/dL    Triglycerides 270 (H) <150 mg/dL    Direct Measure HDL 50 >=40 mg/dL    LDL Cholesterol Calculated 80 <=100 mg/dL    Non HDL Cholesterol 134 (H) <130 mg/dL    Patient Fasting > 8hrs? No     Narrative    Cholesterol  Desirable:  <200 mg/dL    Triglycerides  Normal:  Less than 150 mg/dL  Borderline High:  150-199 mg/dL  High:  200-499 mg/dL  Very High:  Greater than or equal to 500 mg/dL    Direct Measure HDL  Female:  Greater than or equal to 50 mg/dL   Male:  Greater than or equal to 40 mg/dL    LDL Cholesterol  Desirable:  <100mg/dL  Above Desirable:  100-129 mg/dL   Borderline High:  130-159 mg/dL   High:  160-189 mg/dL   Very High:  >= 190 mg/dL    Non HDL Cholesterol  Desirable:  130 mg/dL  Above Desirable:  130-159 mg/dL  Borderline High:  160-189 mg/dL  High:  190-219 mg/dL  Very High:  Greater than or equal to 220 mg/dL   Basic metabolic panel  (Ca, Cl, CO2, Creat, Gluc, K, Na, BUN)   Result Value Ref Range    Sodium 136 133 - 144 mmol/L    Potassium 4.5 3.4 - 5.3 mmol/L    Chloride 101 94 - 109 mmol/L    Carbon Dioxide (CO2) 28 20 - 32 mmol/L    Anion Gap 7 3 - 14 mmol/L    Urea Nitrogen 15 7 - 30 mg/dL    Creatinine 1.20 0.66 - 1.25 mg/dL    Calcium 9.4 8.5 - 10.1 mg/dL    Glucose 97 70 - 99 mg/dL    GFR Estimate 73 >60 mL/min/1.73m2      Comment:      Effective December 21, 2021 eGFRcr in  adults is calculated using the 2021 CKD-EPI creatinine equation which includes age and gender (Juliana et al., NEJM, DOI: 10.1056/QFHOkc4198823)       If you have any questions or concerns, please call the clinic at the number listed above.       Sincerely,      Wade Ely DO/radha

## 2022-05-17 NOTE — PROGRESS NOTES
SUBJECTIVE:   CC: Alin Yang is an 51 year old male who presents for preventative health visit.       Patient has been advised of split billing requirements and indicates understanding: Yes  Healthy Habits:     Getting at least 3 servings of Calcium per day:  Yes    Bi-annual eye exam:  Yes    Dental care twice a year:  Yes    Sleep apnea or symptoms of sleep apnea:  None    Diet:  Regular (no restrictions)    Frequency of exercise:  2-3 days/week    Duration of exercise:  Other    Taking medications regularly:  Yes    Medication side effects:  None    PHQ-2 Total Score: 0    Additional concerns today:  Yes              Today's PHQ-2 Score:   PHQ-2 ( 1999 Pfizer) 5/17/2022   Q1: Little interest or pleasure in doing things 0   Q2: Feeling down, depressed or hopeless 0   PHQ-2 Score 0   PHQ-2 Total Score (12-17 Years)- Positive if 3 or more points; Administer PHQ-A if positive -   Q1: Little interest or pleasure in doing things Not at all   Q2: Feeling down, depressed or hopeless Not at all   PHQ-2 Score 0       Abuse: Current or Past(Physical, Sexual or Emotional)- No  Do you feel safe in your environment? Yes    Have you ever done Advance Care Planning? (For example, a Health Directive, POLST, or a discussion with a medical provider or your loved ones about your wishes): No, advance care planning information given to patient to review.  Advanced care planning was discussed at today's visit.    Social History     Tobacco Use     Smoking status: Former Smoker     Smokeless tobacco: Former User   Substance Use Topics     Alcohol use: Yes     Comment: weekends (5-6 beers)     If you drink alcohol do you typically have >3 drinks per day or >7 drinks per week? Yes      Alcohol Use 5/17/2022   Prescreen: >3 drinks/day or >7 drinks/week? Yes   Prescreen: >3 drinks/day or >7 drinks/week? -   AUDIT SCORE  8     AUDIT - Alcohol Use Disorders Identification Test - Reproduced from the World Health Organization Audit 2001  (Second Edition) 5/17/2022   1.  How often do you have a drink containing alcohol? 2 to 3 times a week   2.  How many drinks containing alcohol do you have on a typical day when you are drinking? 5 or 6   3.  How often do you have five or more drinks on one occasion? Weekly   4.  How often during the last year have you found that you were not able to stop drinking once you had started? Never   5.  How often during the last year have you failed to do what was normally expected of you because of drinking? Never   6.  How often during the last year have you needed a first drink in the morning to get yourself going after a heavy drinking session? Never   7.  How often during the last year have you had a feeling of guilt or remorse after drinking? Never   8.  How often during the last year have you been unable to remember what happened the night before because of your drinking? Never   9.  Have you or someone else been injured because of your drinking? No   10. Has a relative, friend, doctor or other health care worker been concerned about your drinking or suggested you cut down? No   TOTAL SCORE 8       Last PSA: No results found for: PSA    Reviewed orders with patient. Reviewed health maintenance and updated orders accordingly - Yes    Reviewed and updated as needed this visit by clinical staff   Tobacco  Allergies  Meds   Med Hx  Surg Hx  Fam Hx  Soc Hx          Reviewed and updated as needed this visit by Provider   Tobacco  Allergies                Past Medical History:   Diagnosis Date     Alopecia       Past Surgical History:   Procedure Laterality Date     FINGER SURGERY Left     4th phalanx       Review of Systems   Constitutional: Negative for chills and fever.   HENT: Negative for congestion, ear pain, hearing loss and sore throat.    Eyes: Negative for pain and visual disturbance.   Respiratory: Negative for cough and shortness of breath.    Cardiovascular: Negative for chest pain, palpitations and  "peripheral edema.   Gastrointestinal: Negative for abdominal pain, constipation, diarrhea, heartburn, hematochezia and nausea.   Genitourinary: Negative for dysuria, frequency, genital sores, hematuria, impotence, penile discharge and urgency.   Musculoskeletal: Negative for arthralgias, joint swelling and myalgias.   Skin: Negative for rash.   Neurological: Negative for dizziness, weakness, headaches and paresthesias.   Psychiatric/Behavioral: Negative for mood changes. The patient is not nervous/anxious.          OBJECTIVE:   BP (!) 150/80   Pulse 79   Temp 97.9  F (36.6  C) (Tympanic)   Ht 1.918 m (6' 3.5\")   Wt 127.7 kg (281 lb 9.6 oz)   SpO2 98%   BMI 34.73 kg/m      Physical Exam  Constitutional:       General: He is not in acute distress.  HENT:      Head: Normocephalic and atraumatic.      Right Ear: External ear normal.      Left Ear: External ear normal.      Nose: Nose normal.      Mouth/Throat:      Pharynx: No oropharyngeal exudate.   Eyes:      General:         Right eye: No discharge.         Left eye: No discharge.      Conjunctiva/sclera: Conjunctivae normal.      Pupils: Pupils are equal, round, and reactive to light.   Neck:      Thyroid: No thyromegaly.      Trachea: No tracheal deviation.   Cardiovascular:      Rate and Rhythm: Normal rate and regular rhythm.      Heart sounds: Normal heart sounds. No murmur heard.  Pulmonary:      Effort: No respiratory distress.      Breath sounds: Normal breath sounds. No wheezing.   Chest:      Chest wall: No tenderness.   Abdominal:      General: There is no distension.      Palpations: Abdomen is soft. There is no mass.      Tenderness: There is no abdominal tenderness. There is no guarding.   Musculoskeletal:         General: Normal range of motion.      Cervical back: Normal range of motion and neck supple.   Lymphadenopathy:      Cervical: No cervical adenopathy.   Skin:     General: Skin is warm.      Findings: No erythema or rash. " "  Neurological:      Mental Status: He is alert and oriented to person, place, and time.      Cranial Nerves: No cranial nerve deficit.      Coordination: Coordination normal.         ASSESSMENT/PLAN:       1. Routine general medical examination at a health care facility    2. Essential hypertension  Not well controlled.  Has not been on hydrochlorothiazide for 3 weeks.  Advised to schedule 1 month follow-up of send blood pressure reading via Mychart  - hydrochlorothiazide (HYDRODIURIL) 25 MG tablet; Take 1 tablet (25 mg) by mouth daily Medication is being filled for 1 time refill only due to:  Patient needs to be seen because it has been more than one year since last visit.  Dispense: 90 tablet; Refill: 3  - Basic metabolic panel  (Ca, Cl, CO2, Creat, Gluc, K, Na, BUN); Future    3. Screen for colon cancer  - COLOGUARD([employer] SCI[employer])    4. Advanced directives, counseling/discussion    5. Lipid screening  - Lipid panel reflex to direct LDL Non-fasting; Future    COUNSELING:   Reviewed preventive health counseling, as reflected in patient instructions       Regular exercise       Healthy diet/nutrition    Estimated body mass index is 34.73 kg/m  as calculated from the following:    Height as of this encounter: 1.918 m (6' 3.5\").    Weight as of this encounter: 127.7 kg (281 lb 9.6 oz).     Weight management plan: Discussed healthy diet and exercise guidelines    He reports that he has quit smoking. He has quit using smokeless tobacco.      Counseling Resources:  ATP IV Guidelines  Pooled Cohorts Equation Calculator  FRAX Risk Assessment  ICSI Preventive Guidelines  Dietary Guidelines for Americans, 2010  USDA's MyPlate  ASA Prophylaxis  Lung CA Screening    Wade Ely DO  [facility]  "

## 2022-07-30 ENCOUNTER — MYC MEDICAL ADVICE (OUTPATIENT)
Dept: FAMILY MEDICINE | Facility: CLINIC | Age: 52
End: 2022-07-30

## 2022-10-04 NOTE — PATIENT INSTRUCTIONS
Maureen Yang,    Welcome to our clinic!    Thank you for allowing [facility] to manage your care.    I ordered some fasting blood work, please go to the laboratory to get your laboratory studies.    I sent your prescriptions to your pharmacy.    Please allow 1-2 business days for our office to call you in regards to your laboratory/radiological studies.  If not done so, I encourage you to login into [email or web address] to review your results as well.     Encourage 2 hours per week of cardiovascular activities (cycling, swimming, jogging, elliptical, or treadmill)    If you have any questions or concerns, please feel free to call us at [phone].    Sincerely,    Dr. Ely          Patient Education     Low-Salt Diet  This diet removes foods that are high in salt. It also limits the amount of salt you use when cooking. It is most often used for people with high blood pressure, edema (fluid retention), and kidney, liver, or heart disease.  Table salt contains the mineral sodium. Your body needs sodium to work normally. But too much sodium can make your health problems worse. Your healthcare provider is recommending a low-salt (also called low-sodium) diet for you. Your total daily allowance of salt is 1,500 to 2,300 milligrams (mg). It is less than 1 teaspoon of table salt. This means you can have only about 500 to 700 mg of sodium at each meal. People with certain health problems should limit salt intake to the lower end of the recommended range.    When you cook, don t add much salt. If you can cook without using salt, even better. Don t add salt to your food at the table.  When shopping, read food labels. Salt is often called sodium on the label. Choose foods that are salt-free, low salt, or very low salt. Note that foods with reduced salt may not lower your salt intake enough.    Beans, potatoes, and pasta  Ok: Dry beans, split peas, lentils, potatoes, rice, macaroni,  pasta, spaghetti without added salt  Avoid: Potato chips, tortilla chips, and similar products  Breads and cereals  Ok: Low-sodium breads, rolls, cereals, and cakes; low-salt crackers, matzo crackers  Avoid: Salted crackers, pretzels, popcorn, Sinhala toast, pancakes, muffins  Dairy  Ok: Milk, chocolate milk, hot chocolate mix, low-salt cheeses, and yogurt  Avoid: Processed cheese and cheese spreads; Roquefort, Camembert, and cottage cheese; buttermilk, instant breakfast drink  Desserts  Ok: Ice cream, frozen yogurt, juice bars, gelatin, cookies and pies, sugar, honey, jelly, hard candy  Avoid: Most pies, cakes and cookies prepared or processed with salt; instant pudding  Drinks  Ok: Tea, coffee, fizzy (carbonated) drinks, juices  Avoid: Flavored coffees, electrolyte replacement drinks, sports drinks  Meats  Ok: All fresh meat, fish, poultry, low-salt tuna, eggs, egg substitute  Avoid: Smoked, pickled, brine-cured, or salted meats and fish. This includes masterson, chipped beef, corned beef, hot dogs, deli meats, ham, kosher meats, salt pork, sausage, canned tuna, salted codfish, smoked salmon, herring, sardines, or anchovies.  Seasonings and spices  Ok: Most seasonings are okay. Good substitutes for salt include: fresh herb blends, hot sauce, lemon, garlic, rodrigues, vinegar, dry mustard, parsley, cilantro, horseradish, tomato paste, regular margarine, mayonnaise, unsalted butter, cream cheese, vegetable oil, cream, low-salt salad dressing and gravy.  Avoid: Regular ketchup, relishes, pickles, soy sauce, teriyaki sauce, Worcestershire sauce, BBQ sauce, tartar sauce, meat tenderizer, chili sauce, regular gravy, regular salad dressing, salted butter  Soups  Ok: Low-salt soups and broths made with allowed foods  Avoid: Bouillon cubes, soups with smoked or salted meats, regular soup and broth  Vegetables  Ok: Most vegetables are okay; also low-salt tomato and vegetable juices  Avoid: Sauerkraut and other brine-soaked  vegetables; pickles and other pickled vegetables; tomato juice, olives  Date Last Reviewed: 8/1/2016 2000-2018 [employer]. [address] [place] [address], [place] [address]. All rights reserved. This information is not intended as a substitute for professional medical care. Always follow your healthcare professional's instructions.            No

## 2023-04-03 ENCOUNTER — OFFICE VISIT (OUTPATIENT)
Dept: FAMILY MEDICINE | Facility: CLINIC | Age: 53
End: 2023-04-03
Payer: COMMERCIAL

## 2023-04-03 VITALS
SYSTOLIC BLOOD PRESSURE: 135 MMHG | OXYGEN SATURATION: 99 % | HEART RATE: 88 BPM | WEIGHT: 295 LBS | BODY MASS INDEX: 35.92 KG/M2 | TEMPERATURE: 97.4 F | HEIGHT: 76 IN | DIASTOLIC BLOOD PRESSURE: 88 MMHG | RESPIRATION RATE: 16 BRPM

## 2023-04-03 DIAGNOSIS — E66.01 MORBID OBESITY (H): ICD-10-CM

## 2023-04-03 DIAGNOSIS — I10 ESSENTIAL HYPERTENSION: Primary | ICD-10-CM

## 2023-04-03 PROCEDURE — 90750 HZV VACC RECOMBINANT IM: CPT | Performed by: PHYSICIAN ASSISTANT

## 2023-04-03 PROCEDURE — 99214 OFFICE O/P EST MOD 30 MIN: CPT | Mod: 25 | Performed by: PHYSICIAN ASSISTANT

## 2023-04-03 PROCEDURE — 90471 IMMUNIZATION ADMIN: CPT | Performed by: PHYSICIAN ASSISTANT

## 2023-04-03 RX ORDER — LISINOPRIL 10 MG/1
10 TABLET ORAL DAILY
Qty: 90 TABLET | Refills: 3 | Status: SHIPPED | OUTPATIENT
Start: 2023-04-03 | End: 2024-04-15

## 2023-04-03 RX ORDER — HYDROCHLOROTHIAZIDE 25 MG/1
25 TABLET ORAL DAILY
Qty: 90 TABLET | Refills: 3 | Status: SHIPPED | OUTPATIENT
Start: 2023-04-03 | End: 2024-04-15

## 2023-04-03 ASSESSMENT — ENCOUNTER SYMPTOMS
SORE THROAT: 0
HEARTBURN: 0
ABDOMINAL PAIN: 0
NAUSEA: 0
WEAKNESS: 0
EYE PAIN: 0
FREQUENCY: 0
PALPITATIONS: 0
HEMATOCHEZIA: 0
CHILLS: 0
PARESTHESIAS: 0
DIARRHEA: 0
HEMATURIA: 0
HEADACHES: 0
NERVOUS/ANXIOUS: 0
JOINT SWELLING: 0
MYALGIAS: 0
DIZZINESS: 0
FEVER: 0
SHORTNESS OF BREATH: 0
ARTHRALGIAS: 0
DYSURIA: 0
CONSTIPATION: 0
COUGH: 0

## 2023-04-03 ASSESSMENT — PAIN SCALES - GENERAL: PAINLEVEL: NO PAIN (0)

## 2023-04-03 NOTE — PROGRESS NOTES
SUBJECTIVE:   CC: Alin is an 52 year old who presents for preventative health visit.        View : No data to display.                ASSESSMENT / PLAN:  (I10) Essential hypertension  (primary encounter diagnosis)  Comment: Stable  Plan: hydrochlorothiazide (HYDRODIURIL) 25 MG tablet,        lisinopril (ZESTRIL) 10 MG tablet    Continue medications     (E66.01) Morbid obesity (H)  Comment: Ongoing  Plan: Continue with healthy diet and exercise where able. Been unable to work out as often from plantar fasciitis which is getting a little better.           Healthy Habits:     Getting at least 3 servings of Calcium per day:  NO    Bi-annual eye exam:  NO    Dental care twice a year:  Yes    Sleep apnea or symptoms of sleep apnea:  None    Diet:  Regular (no restrictions)    Frequency of exercise:  2-3 days/week    Duration of exercise:  Other    Taking medications regularly:  Yes    Medication side effects:  None    PHQ-2 Total Score: 0    Additional concerns today:  No            Today's PHQ-2 Score:       4/3/2023     1:38 PM   PHQ-2 ( 1999 Pfizer)   Q1: Little interest or pleasure in doing things 0   Q2: Feeling down, depressed or hopeless 0   PHQ-2 Score 0   Q1: Little interest or pleasure in doing things Not at all    Not at all   Q2: Feeling down, depressed or hopeless Not at all    Not at all   PHQ-2 Score 0    0           Social History     Tobacco Use     Smoking status: Former     Smokeless tobacco: Former   Vaping Use     Vaping status: Never Used   Substance Use Topics     Alcohol use: Yes     Comment: weekends (5-6 beers)             4/3/2023     1:38 PM   Alcohol Use   Prescreen: >3 drinks/day or >7 drinks/week? Yes   AUDIT SCORE  6       Last PSA: No results found for: PSA    Reviewed orders with patient. Reviewed health maintenance and updated orders accordingly - Yes      Reviewed and updated as needed this visit by clinical staff   Tobacco  Allergies  Meds  Problems  Med Hx  Surg Hx  Fam Hx    "       Reviewed and updated as needed this visit by Provider   Tobacco  Allergies  Meds  Problems  Med Hx  Surg Hx  Fam Hx         Past Medical History:   Diagnosis Date     Alopecia       Past Surgical History:   Procedure Laterality Date     FINGER SURGERY Left     4th phalanx       Review of Systems   Constitutional: Negative for chills and fever.   HENT: Negative for congestion, ear pain, hearing loss and sore throat.    Eyes: Negative for pain and visual disturbance.   Respiratory: Negative for cough and shortness of breath.    Cardiovascular: Negative for chest pain, palpitations and peripheral edema.   Gastrointestinal: Negative for abdominal pain, constipation, diarrhea, heartburn, hematochezia and nausea.   Genitourinary: Negative for dysuria, frequency, genital sores, hematuria and urgency.   Musculoskeletal: Negative for arthralgias, joint swelling and myalgias.   Skin: Negative for rash.   Neurological: Negative for dizziness, weakness, headaches and paresthesias.   Psychiatric/Behavioral: Negative for mood changes. The patient is not nervous/anxious.      CONSTITUTIONAL: NEGATIVE for fever, chills, change in weight  INTEGUMENTARY/SKIN: NEGATIVE for worrisome rashes, moles or lesions  EYES: NEGATIVE for vision changes or irritation  ENT: NEGATIVE for ear, mouth and throat problems  RESP: NEGATIVE for significant cough or SOB  CV: NEGATIVE for chest pain, palpitations or peripheral edema  GI: NEGATIVE for nausea, abdominal pain, heartburn, or change in bowel habits   male: negative for dysuria, hematuria, decreased urinary stream, erectile dysfunction, urethral discharge  MUSCULOSKELETAL: NEGATIVE for significant arthralgias or myalgia  NEURO: NEGATIVE for weakness, dizziness or paresthesias  PSYCHIATRIC: NEGATIVE for changes in mood or affect    OBJECTIVE:   /88   Pulse 88   Temp 97.4  F (36.3  C) (Tympanic)   Resp 16   Ht 1.918 m (6' 3.5\")   Wt 133.8 kg (295 lb)   SpO2 99%   BMI " "36.39 kg/m      Physical Exam  GENERAL: healthy, alert and no distress  EYES: Eyes grossly normal to inspection  NECK: no adenopathy, no asymmetry, masses, or scars and thyroid normal to palpation  RESP: lungs clear to auscultation - no rales, rhonchi or wheezes  CV: regular rate and rhythm, normal S1 S2, no S3 or S4, no murmur, click or rub, no peripheral edema and peripheral pulses strong  ABDOMEN: soft, nontender, no hepatosplenomegaly, no masses and bowel sounds normal  MS: no gross musculoskeletal defects noted, no edema          COUNSELING:   Reviewed preventive health counseling, as reflected in patient instructions       Regular exercise       Healthy diet/nutrition      BMI:   Estimated body mass index is 36.39 kg/m  as calculated from the following:    Height as of this encounter: 1.918 m (6' 3.5\").    Weight as of this encounter: 133.8 kg (295 lb).   Weight management plan: Discussed healthy diet and exercise guidelines      He reports that he has quit smoking. He has quit using smokeless tobacco.            ANGELES GUERIN PA-C  [facility]  "

## 2023-04-23 ENCOUNTER — HEALTH MAINTENANCE LETTER (OUTPATIENT)
Age: 53
End: 2023-04-23

## 2023-07-16 ENCOUNTER — HEALTH MAINTENANCE LETTER (OUTPATIENT)
Age: 53
End: 2023-07-16

## 2024-04-08 NOTE — PROGRESS NOTES
"    {PROVIDER CHARTING PREFERENCE:939419}      Subjective   Alin is a 53 year old, presenting for the following health issues:  Recheck Medication        4/15/2024     1:37 PM   Additional Questions   Roomed by Aditi Pennington MA   Accompanied by Self       History of Present Illness       Reason for visit:  Yearly    He eats 2-3 servings of fruits and vegetables daily.He consumes 0 sweetened beverage(s) daily.He exercises with enough effort to increase his heart rate 30 to 60 minutes per day.  He exercises with enough effort to increase his heart rate 3 or less days per week.   He is taking medications regularly.           Review of Systems  Constitutional, neuro, ENT, endocrine, pulmonary, cardiac, gastrointestinal, genitourinary, musculoskeletal, integument and psychiatric systems are negative, except as otherwise noted.        Objective    /84   Pulse 82   Temp 97.9  F (36.6  C) (Tympanic)   Resp 14   Ht 1.943 m (6' 4.5\")   Wt 134.3 kg (296 lb)   SpO2 99%   BMI 35.56 kg/m    Body mass index is 35.56 kg/m .      Physical Exam   {Brief/partially selected :254017}        Signed Electronically by: Richi Almonte PA-C  {Email feedback regarding this note to [email or web address]   :[id number]}    "

## 2024-04-15 ENCOUNTER — OFFICE VISIT (OUTPATIENT)
Dept: FAMILY MEDICINE | Facility: CLINIC | Age: 54
End: 2024-04-15
Payer: COMMERCIAL

## 2024-04-15 VITALS
HEART RATE: 82 BPM | RESPIRATION RATE: 14 BRPM | WEIGHT: 296 LBS | DIASTOLIC BLOOD PRESSURE: 84 MMHG | HEIGHT: 77 IN | OXYGEN SATURATION: 99 % | TEMPERATURE: 97.9 F | BODY MASS INDEX: 34.95 KG/M2 | SYSTOLIC BLOOD PRESSURE: 137 MMHG

## 2024-04-15 DIAGNOSIS — E78.5 BORDERLINE HYPERLIPIDEMIA: ICD-10-CM

## 2024-04-15 DIAGNOSIS — I10 ESSENTIAL HYPERTENSION: ICD-10-CM

## 2024-04-15 DIAGNOSIS — Z00.00 ROUTINE HISTORY AND PHYSICAL EXAMINATION OF ADULT: Primary | ICD-10-CM

## 2024-04-15 PROCEDURE — 99213 OFFICE O/P EST LOW 20 MIN: CPT | Mod: 25 | Performed by: PHYSICIAN ASSISTANT

## 2024-04-15 PROCEDURE — 99396 PREV VISIT EST AGE 40-64: CPT | Performed by: PHYSICIAN ASSISTANT

## 2024-04-15 RX ORDER — LISINOPRIL 10 MG/1
10 TABLET ORAL DAILY
Qty: 90 TABLET | Refills: 3 | Status: SHIPPED | OUTPATIENT
Start: 2024-04-15

## 2024-04-15 RX ORDER — HYDROCHLOROTHIAZIDE 25 MG/1
25 TABLET ORAL DAILY
Qty: 90 TABLET | Refills: 3 | Status: SHIPPED | OUTPATIENT
Start: 2024-04-15

## 2024-04-15 ASSESSMENT — PAIN SCALES - GENERAL: PAINLEVEL: NO PAIN (0)

## 2024-04-15 NOTE — PROGRESS NOTES
"Preventive Care Visit  [facility]  Richi Almonte PA-C, Physician Assistant - Medical  Apr 15, 2024      Assessment & Plan     Routine history and physical examination of adult  Completed   Declines colon screen- not interested in getting lifetime    Essential hypertension  Stable, well controlled   - lisinopril (ZESTRIL) 10 MG tablet; Take 1 tablet (10 mg) by mouth daily  - hydrochlorothiazide (HYDRODIURIL) 25 MG tablet; Take 1 tablet (25 mg) by mouth daily  - Basic metabolic panel  (Ca, Cl, CO2, Creat, Gluc, K, Na, BUN); Future  Refilled meds x 1 year. No changes needed     Borderline hyperlipidemia  recheck  - Lipid panel reflex to direct LDL Fasting; Future  Set up fasting labs           BMI  Estimated body mass index is 35.56 kg/m  as calculated from the following:    Height as of this encounter: 1.943 m (6' 4.5\").    Weight as of this encounter: 134.3 kg (296 lb).   Weight management plan: Discussed healthy diet and exercise guidelines      Follow up yearly for prevent     Antwon Ogden is a 53 year old, presenting for the following:  Recheck Medication        4/15/2024     1:37 PM   Additional Questions   Roomed by Aditi Pennington MA   Accompanied by Self        Health Care Directive  Patient does not have a Health Care Directive or Living Will:     HPI        4/3/2023   Nutrition   Three or more servings of calcium each day? No   Diet: regular (no restrictions)         4/3/2023   Exercise   Frequency of exercise: 2-3 days/week            4/3/2023   Dental   Dentist two times every year? Yes            Today's PHQ-2 Score:       4/15/2024     1:35 PM   PHQ-2 ( 1999 [employer])   Q1: Little interest or pleasure in doing things 0   Q2: Feeling down, depressed or hopeless 0   PHQ-2 Score 0   Q1: Little interest or pleasure in doing things Not at all   Q2: Feeling down, depressed or hopeless Not at all   PHQ-2 Score 0           4/3/2023   Substance Use   Alcohol more than 3/day or more than 7/wk " "Yes   How often do you have a drink containing alcohol 2 to 3 times a week   How many alcohol drinks on typical day 3 or 4   How often do you have 5+ drinks at one occasion Monthly   Audit 2/3 Score 3   How often not able to stop drinking once started Never   How often failed to do what normally expected Never   How often needed first drink in am after a heavy drinking session Never   How often feeling of guilt or remorse after drinking Never   How often unable to remember what happened the night before Never   Have you or someone else been injured because of your drinking No   Has anyone been concerned or suggested you cut down on drinking No   TOTAL SCORE - AUDIT 6     Social History     Tobacco Use    Smoking status: Former    Smokeless tobacco: Former   Vaping Use    Vaping status: Never Used   Substance Use Topics    Alcohol use: Yes     Comment: weekends (5-6 beers)    Drug use: No       ASCVD Risk   The 10-year ASCVD risk score (Debby WARD, et al., 2019) is: 6.1%    Values used to calculate the score:      Age: 53 years      Sex: Male      Is Non- : No      Diabetic: No      Tobacco smoker: No      Systolic Blood Pressure: 137 mmHg      Is BP treated: Yes      HDL Cholesterol: 50 mg/dL      Total Cholesterol: 184 mg/dL           Reviewed and updated as needed this visit by Provider   Tobacco  Allergies  Meds  Problems  Med Hx  Surg Hx  Fam Hx            Past Medical History:   Diagnosis Date    Alopecia      Past Surgical History:   Procedure Laterality Date    FINGER SURGERY Left     4th phalanx     Lab work is in process  Labs reviewed in EPIC      Review of Systems  Constitutional, HEENT, cardiovascular, pulmonary, gi and gu systems are negative, except as otherwise noted.     Objective    Exam  /84   Pulse 82   Temp 97.9  F (36.6  C) (Tympanic)   Resp 14   Ht 1.943 m (6' 4.5\")   Wt 134.3 kg (296 lb)   SpO2 99%   BMI 35.56 kg/m     Estimated body mass " "index is 35.56 kg/m  as calculated from the following:    Height as of this encounter: 1.943 m (6' 4.5\").    Weight as of this encounter: 134.3 kg (296 lb).    Physical Exam  GENERAL: alert and no distress  EYES: Eyes grossly normal to inspection, PERRL and conjunctivae and sclerae normal  HENT: ear canals and TM's normal, nose and mouth without ulcers or lesions  NECK: no adenopathy, no asymmetry, masses, or scars  RESP: lungs clear to auscultation - no rales, rhonchi or wheezes  CV: regular rate and rhythm, normal S1 S2, no S3 or S4, no murmur, click or rub, no peripheral edema  ABDOMEN: soft, nontender, no hepatosplenomegaly, no masses and bowel sounds normal  MS: no gross musculoskeletal defects noted, no edema  PSYCH: mentation appears normal, affect normal/bright        Signed Electronically by: Richi Almonte PA-C    "

## 2025-04-01 DIAGNOSIS — I10 ESSENTIAL HYPERTENSION: ICD-10-CM

## 2025-04-02 RX ORDER — LISINOPRIL 10 MG/1
10 TABLET ORAL
Qty: 30 TABLET | Refills: 0 | Status: SHIPPED | OUTPATIENT
Start: 2025-04-02

## 2025-04-02 RX ORDER — HYDROCHLOROTHIAZIDE 25 MG/1
25 TABLET ORAL
Qty: 30 TABLET | Refills: 0 | Status: SHIPPED | OUTPATIENT
Start: 2025-04-02

## 2025-04-30 DIAGNOSIS — I10 ESSENTIAL HYPERTENSION: ICD-10-CM

## 2025-04-30 RX ORDER — LISINOPRIL 10 MG/1
10 TABLET ORAL
Qty: 30 TABLET | Refills: 0 | Status: SHIPPED | OUTPATIENT
Start: 2025-04-30

## 2025-04-30 RX ORDER — HYDROCHLOROTHIAZIDE 25 MG/1
25 TABLET ORAL
Qty: 30 TABLET | Refills: 0 | Status: SHIPPED | OUTPATIENT
Start: 2025-04-30

## 2025-05-31 DIAGNOSIS — I10 ESSENTIAL HYPERTENSION: ICD-10-CM

## 2025-06-01 ENCOUNTER — HEALTH MAINTENANCE LETTER (OUTPATIENT)
Age: 55
End: 2025-06-01

## 2025-06-02 RX ORDER — LISINOPRIL 10 MG/1
10 TABLET ORAL
Qty: 30 TABLET | Refills: 0 | Status: SHIPPED | OUTPATIENT
Start: 2025-06-02

## 2025-06-02 RX ORDER — HYDROCHLOROTHIAZIDE 25 MG/1
25 TABLET ORAL
Qty: 30 TABLET | Refills: 0 | Status: SHIPPED | OUTPATIENT
Start: 2025-06-02

## 2025-06-29 DIAGNOSIS — I10 ESSENTIAL HYPERTENSION: ICD-10-CM

## 2025-06-30 RX ORDER — LISINOPRIL 10 MG/1
10 TABLET ORAL
Qty: 30 TABLET | Refills: 0 | Status: SHIPPED | OUTPATIENT
Start: 2025-06-30

## 2025-06-30 RX ORDER — HYDROCHLOROTHIAZIDE 25 MG/1
25 TABLET ORAL
Qty: 30 TABLET | Refills: 0 | Status: SHIPPED | OUTPATIENT
Start: 2025-06-30

## 2025-07-27 DIAGNOSIS — I10 ESSENTIAL HYPERTENSION: ICD-10-CM

## 2025-07-28 RX ORDER — LISINOPRIL 10 MG/1
10 TABLET ORAL
Qty: 30 TABLET | Refills: 0 | Status: SHIPPED | OUTPATIENT
Start: 2025-07-28

## 2025-07-28 RX ORDER — HYDROCHLOROTHIAZIDE 25 MG/1
25 TABLET ORAL
Qty: 30 TABLET | Refills: 0 | Status: SHIPPED | OUTPATIENT
Start: 2025-07-28